# Patient Record
Sex: FEMALE | Race: WHITE | NOT HISPANIC OR LATINO | URBAN - METROPOLITAN AREA
[De-identification: names, ages, dates, MRNs, and addresses within clinical notes are randomized per-mention and may not be internally consistent; named-entity substitution may affect disease eponyms.]

---

## 2023-06-29 ENCOUNTER — OFFICE VISIT (OUTPATIENT)
Dept: PRIMARY CARE | Facility: CLINIC | Age: 78
End: 2023-06-29
Payer: MEDICARE

## 2023-06-29 VITALS — SYSTOLIC BLOOD PRESSURE: 131 MMHG | DIASTOLIC BLOOD PRESSURE: 74 MMHG

## 2023-06-29 DIAGNOSIS — R74.8 ELEVATED LIVER ENZYMES: ICD-10-CM

## 2023-06-29 DIAGNOSIS — K21.9 GASTROESOPHAGEAL REFLUX DISEASE WITHOUT ESOPHAGITIS: ICD-10-CM

## 2023-06-29 DIAGNOSIS — I10 PRIMARY HYPERTENSION: Primary | ICD-10-CM

## 2023-06-29 DIAGNOSIS — G40.309 NONINTRACTABLE GENERALIZED IDIOPATHIC EPILEPSY WITHOUT STATUS EPILEPTICUS (MULTI): ICD-10-CM

## 2023-06-29 DIAGNOSIS — M81.0 AGE RELATED OSTEOPOROSIS, UNSPECIFIED PATHOLOGICAL FRACTURE PRESENCE: ICD-10-CM

## 2023-06-29 PROCEDURE — 99204 OFFICE O/P NEW MOD 45 MIN: CPT | Performed by: INTERNAL MEDICINE

## 2023-06-29 PROCEDURE — 3078F DIAST BP <80 MM HG: CPT | Performed by: INTERNAL MEDICINE

## 2023-06-29 PROCEDURE — 3075F SYST BP GE 130 - 139MM HG: CPT | Performed by: INTERNAL MEDICINE

## 2023-06-29 NOTE — PROGRESS NOTES
Subjective   Patient ID: Consuelo Harrison is a 78 y.o. female who presents for No chief complaint on file..    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 more recently was diagnosed with seizures still has some word finding issues discussed with medication and neurology follow-up no chest pain no shortness of breath no side effect with medications otherwise    Past medical history seizure osteoporosis hyperlipidemia depression peptic ulcer disease    Medication Vimpat Fosamax clonazepam atorvastatin citalopram aspirin    Allergies no known drug allergies    Social history no tobacco no alcohol    Family history noted and unchanged no seizures  has 3 cancers    Prevention some exercise discussed with having had recent or scheduled bone density test some prior blood work reviewed lipids adequate blood count normal enzymes slightly elevated    Review of Systems    Objective   There were no vitals taken for this visit.    Physical Exam vital signs noted alert and oriented x3 NCAT PERRLA EOMI nares without discharge OP benign TM normal bilateral EAC clear bilateral no AC nodes no JVD no icterus no jaundice no pallor no thyromegaly chest clear to auscultation CV regular rate and rhythm S1-S2 abdomen soft nontender normal active bowel sounds extremities no clubbing cyanosis or edema normal distal pulses    Assessment/Plan impression seizures osteoporosis GERD elevated LFTs  hypertension  Plan not currently on medication for blood pressure after seizures return to normal or lower than expected and medication discontinued continue to monitor on diet and exercise Will return for physical examination and blood work because of her dyspepsia and similar symptoms in the epigastrium and lack of other known causes such as NSAIDs tobacco alcohol or food related issues but noted small elevation in liver enzymes will go off of the Fosamax x1 month May use Prilosec or Pepcid for  symptomatic release and consider abdominal imaging if no better good diet regular exercise increase water consumption follow-up with neurology regarding seizures and word finding recheck as above TT 50 cc 26

## 2023-09-29 ENCOUNTER — LAB (OUTPATIENT)
Dept: LAB | Facility: LAB | Age: 78
End: 2023-09-29
Payer: MEDICARE

## 2023-09-29 ENCOUNTER — OFFICE VISIT (OUTPATIENT)
Dept: PRIMARY CARE | Facility: CLINIC | Age: 78
End: 2023-09-29
Payer: MEDICARE

## 2023-09-29 VITALS — DIASTOLIC BLOOD PRESSURE: 68 MMHG | SYSTOLIC BLOOD PRESSURE: 118 MMHG

## 2023-09-29 DIAGNOSIS — R74.8 ELEVATED LIVER ENZYMES: ICD-10-CM

## 2023-09-29 DIAGNOSIS — I10 PRIMARY HYPERTENSION: ICD-10-CM

## 2023-09-29 DIAGNOSIS — K21.9 GASTROESOPHAGEAL REFLUX DISEASE WITHOUT ESOPHAGITIS: ICD-10-CM

## 2023-09-29 DIAGNOSIS — I10 PRIMARY HYPERTENSION: Primary | ICD-10-CM

## 2023-09-29 LAB
ALANINE AMINOTRANSFERASE (SGPT) (U/L) IN SER/PLAS: 22 U/L (ref 7–45)
ALBUMIN (G/DL) IN SER/PLAS: 4.7 G/DL (ref 3.4–5)
ALKALINE PHOSPHATASE (U/L) IN SER/PLAS: 71 U/L (ref 33–136)
ANION GAP IN SER/PLAS: 15 MMOL/L (ref 10–20)
ASPARTATE AMINOTRANSFERASE (SGOT) (U/L) IN SER/PLAS: 10 U/L (ref 9–39)
BILIRUBIN TOTAL (MG/DL) IN SER/PLAS: 0.4 MG/DL (ref 0–1.2)
CALCIUM (MG/DL) IN SER/PLAS: 10.2 MG/DL (ref 8.6–10.6)
CARBON DIOXIDE, TOTAL (MMOL/L) IN SER/PLAS: 28 MMOL/L (ref 21–32)
CHLORIDE (MMOL/L) IN SER/PLAS: 104 MMOL/L (ref 98–107)
CHOLESTEROL (MG/DL) IN SER/PLAS: 190 MG/DL (ref 0–199)
CHOLESTEROL IN HDL (MG/DL) IN SER/PLAS: 82.1 MG/DL
CHOLESTEROL/HDL RATIO: 2.3
CREATININE (MG/DL) IN SER/PLAS: 0.85 MG/DL (ref 0.5–1.05)
ERYTHROCYTE DISTRIBUTION WIDTH (RATIO) BY AUTOMATED COUNT: 12.6 % (ref 11.5–14.5)
ERYTHROCYTE MEAN CORPUSCULAR HEMOGLOBIN CONCENTRATION (G/DL) BY AUTOMATED: 32 G/DL (ref 32–36)
ERYTHROCYTE MEAN CORPUSCULAR VOLUME (FL) BY AUTOMATED COUNT: 94 FL (ref 80–100)
ERYTHROCYTES (10*6/UL) IN BLOOD BY AUTOMATED COUNT: 4.61 X10E12/L (ref 4–5.2)
GFR FEMALE: 71 ML/MIN/1.73M2
GLUCOSE (MG/DL) IN SER/PLAS: 79 MG/DL (ref 74–99)
HEMATOCRIT (%) IN BLOOD BY AUTOMATED COUNT: 43.5 % (ref 36–46)
HEMOGLOBIN (G/DL) IN BLOOD: 13.9 G/DL (ref 12–16)
LDL: 89 MG/DL (ref 0–99)
LEUKOCYTES (10*3/UL) IN BLOOD BY AUTOMATED COUNT: 5.7 X10E9/L (ref 4.4–11.3)
NRBC (PER 100 WBCS) BY AUTOMATED COUNT: 0 /100 WBC (ref 0–0)
PLATELETS (10*3/UL) IN BLOOD AUTOMATED COUNT: 240 X10E9/L (ref 150–450)
POTASSIUM (MMOL/L) IN SER/PLAS: 4.7 MMOL/L (ref 3.5–5.3)
PROTEIN TOTAL: 6.6 G/DL (ref 6.4–8.2)
SODIUM (MMOL/L) IN SER/PLAS: 142 MMOL/L (ref 136–145)
TRIGLYCERIDE (MG/DL) IN SER/PLAS: 93 MG/DL (ref 0–149)
UREA NITROGEN (MG/DL) IN SER/PLAS: 21 MG/DL (ref 6–23)
VLDL: 19 MG/DL (ref 0–40)

## 2023-09-29 PROCEDURE — 36415 COLL VENOUS BLD VENIPUNCTURE: CPT

## 2023-09-29 PROCEDURE — 80053 COMPREHEN METABOLIC PANEL: CPT

## 2023-09-29 PROCEDURE — 80061 LIPID PANEL: CPT

## 2023-09-29 PROCEDURE — 3078F DIAST BP <80 MM HG: CPT | Performed by: INTERNAL MEDICINE

## 2023-09-29 PROCEDURE — 99213 OFFICE O/P EST LOW 20 MIN: CPT | Performed by: INTERNAL MEDICINE

## 2023-09-29 PROCEDURE — 3074F SYST BP LT 130 MM HG: CPT | Performed by: INTERNAL MEDICINE

## 2023-09-29 PROCEDURE — 85027 COMPLETE CBC AUTOMATED: CPT

## 2023-09-29 NOTE — PROGRESS NOTES
Subjective   Patient ID: Consuelo Harrison is a 78 y.o. female who presents for No chief complaint on file..    HPI follow-up visit has had no seizures no falls blood pressures have been good felt generally well occasionally will have some reflux type symptoms had some swelling in her right lower leg that was minor holding her legs up helped prevent it from reoccurring bowels normal no dysuria    Review of Systems    Objective   There were no vitals taken for this visit.    Physical Exam vital signs noted alert and oriented x3 NCAT color is good no JVD chest clear to auscultation CV regular rate and rhythm S1 is 2 abdomen soft nontender normal active bowel sounds extremities no clubbing cyanosis or edema normal distal pulses gait slightly unsteady no tremor    Assessment/Plan    impression hypertension?  Seizure?  Elevated liver enzymes?  GERD other diagnoses  Plan check Chem-7 advised on glucose potassium and kidney function has been on less blood pressure medication blood pressure adequate today check CBC advised on blood count avoid NSAIDs okay for Pepcid as she is try not eating late at night check hepatic panel advised on liver enzymes see prior check lipid panel advised on cholesterol profile good diet regular exercise increase water consumption care and safety in the home follow-up with neurology continue with seizure medication and recheck based on above

## 2023-12-05 DIAGNOSIS — Z00.00 HEALTH CARE MAINTENANCE: Primary | ICD-10-CM

## 2023-12-05 RX ORDER — ALENDRONATE SODIUM 70 MG/1
70 TABLET ORAL
Qty: 90 TABLET | Refills: 0 | Status: SHIPPED | OUTPATIENT
Start: 2023-12-05

## 2023-12-05 RX ORDER — ALENDRONATE SODIUM 70 MG/1
70 TABLET ORAL WEEKLY
COMMUNITY
Start: 2016-11-02 | End: 2023-12-05 | Stop reason: SDUPTHER

## 2024-07-26 ENCOUNTER — TELEMEDICINE (OUTPATIENT)
Dept: PRIMARY CARE | Facility: CLINIC | Age: 79
End: 2024-07-26
Payer: MEDICARE

## 2024-07-26 DIAGNOSIS — U07.1 COVID-19 VIRUS INFECTION: Primary | ICD-10-CM

## 2024-07-26 DIAGNOSIS — J01.90 ACUTE SINUSITIS, RECURRENCE NOT SPECIFIED, UNSPECIFIED LOCATION: ICD-10-CM

## 2024-07-26 DIAGNOSIS — R53.83 OTHER FATIGUE: ICD-10-CM

## 2024-07-26 PROCEDURE — 99442 PR PHYS/QHP TELEPHONE EVALUATION 11-20 MIN: CPT | Performed by: INTERNAL MEDICINE

## 2024-07-26 NOTE — PROGRESS NOTES
Subjective   Patient ID: Consuelo Harrison is a 79 y.o. female who presents for No chief complaint on file..    HPI patient initiated telehealth visit this visit was completed via telephone secondary to the restrictions of the COVID-19 pandemic as it was actually about COVID patient was at home physician was in the office all medical issues were addressed and considered with patient patient was not otherwise examined if it was felt that the patient needed to be seen in the office they would have been referred to do so patient verbally consented to visit  Sick visit had COVID-positive test yesterday had symptoms for 2 days uncertain where she been in contact with that but has overwhelming fatigue low-temperature in the high 90s and some sinus no chest pain no shortness of breath    Review of Systems    Objective   There were no vitals taken for this visit.    Physical Exam alert and oriented x 3 breathing unlabored not otherwise examined    Assessment/Plan impression COVID-positive sinus fatigue  Plan okay for Paxlovid therapy 3 tablets in the morning 3 tablets in the evening x 5 days see EMR wrist benefits side effects reviewed with her and wishes to proceed 5 days of masking after isolation and betterment of symptoms good water consumption good nutrition proper rest Tylenol as needed Zyrtec 10 mg as needed per day for the sinus recheck if no better and for regular visit

## 2024-07-26 NOTE — PROGRESS NOTES
Pt was informed of MD note below VIA my chart. Rx sent to pt's pharmacy in [place] as requested.    Subjective   Patient ID: Consuelo Harrison is a 79 y.o. female who presents for No chief complaint on file..    HPI     Review of Systems    Objective   There were no vitals taken for this visit.    Physical Exam    Assessment/Plan       Alexy Garcia MD  Physician  Primary Care     Progress Notes      Sign when Signing Visit     Creation Time: 7/26/2024  1:03 PM     Sign when Signing Visit       Expand All Collapse All       Subjective  Patient ID: Consuelo Harrison is a 79 y.o. female who presents for No chief complaint on file..     HPI patient initiated telehealth visit this visit was completed via telephone secondary to the restrictions of the COVID-19 pandemic as it was actually about COVID patient was at home physician was in the office all medical issues were addressed and considered with patient patient was not otherwise examined if it was felt that the patient needed to be seen in the office they would have been referred to do so patient verbally consented to visit  Sick visit had COVID-positive test yesterday had symptoms for 2 days uncertain where she been in contact with that but has overwhelming fatigue low-temperature in the high 90s and some sinus no chest pain no shortness of breath     Review of Systems           Objective  There were no vitals taken for this visit.     Physical Exam alert and oriented x 3 breathing unlabored not otherwise examined           Assessment/Plan  impression COVID-positive sinus fatigue  Plan okay for Paxlovid therapy 3 tablets in the morning 3 tablets in the evening x 5 days see EMR wrist benefits side effects reviewed with her and wishes to proceed 5 days of masking after isolation and betterment of symptoms good water consumption good nutrition proper rest Tylenol as needed Zyrtec 10 mg as needed per day for the sinus recheck if no better and for regular visit     tt15''                         Orders Only on 7/26/2024       Additional Documentation    Encounter Info:  Billing Info,     History,     Allergies     Orders Placed    None  Medication Changes      nirmatrelvir/ritonavir 300 mg (150 mg x 2)-100 mg 3 tablets oral 2 times daily, Follow the instructions on the package  Medication List  Visit Diagnoses      COVID-19 virus infection  Problem List

## 2024-09-09 ENCOUNTER — LAB (OUTPATIENT)
Dept: LAB | Facility: LAB | Age: 79
End: 2024-09-09
Payer: MEDICARE

## 2024-09-09 ENCOUNTER — APPOINTMENT (OUTPATIENT)
Dept: PRIMARY CARE | Facility: CLINIC | Age: 79
End: 2024-09-09
Payer: MEDICARE

## 2024-09-09 VITALS — DIASTOLIC BLOOD PRESSURE: 69 MMHG | WEIGHT: 152 LBS | SYSTOLIC BLOOD PRESSURE: 124 MMHG

## 2024-09-09 DIAGNOSIS — R55 VASOVAGAL SYNCOPE: ICD-10-CM

## 2024-09-09 DIAGNOSIS — Z00.00 HEALTH CARE MAINTENANCE: Primary | ICD-10-CM

## 2024-09-09 DIAGNOSIS — E78.2 MIXED HYPERLIPIDEMIA: ICD-10-CM

## 2024-09-09 DIAGNOSIS — K21.9 GASTROESOPHAGEAL REFLUX DISEASE WITHOUT ESOPHAGITIS: ICD-10-CM

## 2024-09-09 DIAGNOSIS — I10 PRIMARY HYPERTENSION: ICD-10-CM

## 2024-09-09 DIAGNOSIS — N02.9 IDIOPATHIC HEMATURIA, UNSPECIFIED WHETHER GLOMERULAR MORPHOLOGIC CHANGES PRESENT: ICD-10-CM

## 2024-09-09 DIAGNOSIS — R56.9 SEIZURE (MULTI): ICD-10-CM

## 2024-09-09 LAB
ALBUMIN SERPL BCP-MCNC: 4.7 G/DL (ref 3.4–5)
ALP SERPL-CCNC: 77 U/L (ref 33–136)
ALT SERPL W P-5'-P-CCNC: 24 U/L (ref 7–45)
ANION GAP SERPL CALC-SCNC: 10 MMOL/L (ref 10–20)
AST SERPL W P-5'-P-CCNC: 10 U/L (ref 9–39)
BILIRUB SERPL-MCNC: 0.5 MG/DL (ref 0–1.2)
BUN SERPL-MCNC: 20 MG/DL (ref 6–23)
CALCIUM SERPL-MCNC: 10.6 MG/DL (ref 8.6–10.6)
CHLORIDE SERPL-SCNC: 106 MMOL/L (ref 98–107)
CHOLEST SERPL-MCNC: 204 MG/DL (ref 0–199)
CHOLESTEROL/HDL RATIO: 2.4
CO2 SERPL-SCNC: 30 MMOL/L (ref 21–32)
CREAT SERPL-MCNC: 0.96 MG/DL (ref 0.5–1.05)
EGFRCR SERPLBLD CKD-EPI 2021: 61 ML/MIN/1.73M*2
ERYTHROCYTE [DISTWIDTH] IN BLOOD BY AUTOMATED COUNT: 12.6 % (ref 11.5–14.5)
GLUCOSE SERPL-MCNC: 94 MG/DL (ref 74–99)
HCT VFR BLD AUTO: 42.4 % (ref 36–46)
HDLC SERPL-MCNC: 86 MG/DL
HGB BLD-MCNC: 13.9 G/DL (ref 12–16)
LDLC SERPL CALC-MCNC: 90 MG/DL
MCH RBC QN AUTO: 30.4 PG (ref 26–34)
MCHC RBC AUTO-ENTMCNC: 32.8 G/DL (ref 32–36)
MCV RBC AUTO: 93 FL (ref 80–100)
NON HDL CHOLESTEROL: 118 MG/DL (ref 0–149)
NRBC BLD-RTO: 0 /100 WBCS (ref 0–0)
PLATELET # BLD AUTO: 255 X10*3/UL (ref 150–450)
POTASSIUM SERPL-SCNC: 4.3 MMOL/L (ref 3.5–5.3)
PROT SERPL-MCNC: 6.8 G/DL (ref 6.4–8.2)
RBC # BLD AUTO: 4.57 X10*6/UL (ref 4–5.2)
SODIUM SERPL-SCNC: 142 MMOL/L (ref 136–145)
TRIGL SERPL-MCNC: 139 MG/DL (ref 0–149)
TSH SERPL-ACNC: 1.61 MIU/L (ref 0.44–3.98)
VLDL: 28 MG/DL (ref 0–40)
WBC # BLD AUTO: 8.1 X10*3/UL (ref 4.4–11.3)

## 2024-09-09 PROCEDURE — 85027 COMPLETE CBC AUTOMATED: CPT

## 2024-09-09 PROCEDURE — 1170F FXNL STATUS ASSESSED: CPT | Performed by: INTERNAL MEDICINE

## 2024-09-09 PROCEDURE — 99214 OFFICE O/P EST MOD 30 MIN: CPT | Performed by: INTERNAL MEDICINE

## 2024-09-09 PROCEDURE — 3078F DIAST BP <80 MM HG: CPT | Performed by: INTERNAL MEDICINE

## 2024-09-09 PROCEDURE — G0439 PPPS, SUBSEQ VISIT: HCPCS | Performed by: INTERNAL MEDICINE

## 2024-09-09 PROCEDURE — 81003 URINALYSIS AUTO W/O SCOPE: CPT

## 2024-09-09 PROCEDURE — 84443 ASSAY THYROID STIM HORMONE: CPT

## 2024-09-09 PROCEDURE — 1036F TOBACCO NON-USER: CPT | Performed by: INTERNAL MEDICINE

## 2024-09-09 PROCEDURE — 80053 COMPREHEN METABOLIC PANEL: CPT

## 2024-09-09 PROCEDURE — 1159F MED LIST DOCD IN RCRD: CPT | Performed by: INTERNAL MEDICINE

## 2024-09-09 PROCEDURE — 93000 ELECTROCARDIOGRAM COMPLETE: CPT | Performed by: INTERNAL MEDICINE

## 2024-09-09 PROCEDURE — 80061 LIPID PANEL: CPT

## 2024-09-09 PROCEDURE — 1160F RVW MEDS BY RX/DR IN RCRD: CPT | Performed by: INTERNAL MEDICINE

## 2024-09-09 PROCEDURE — 36415 COLL VENOUS BLD VENIPUNCTURE: CPT

## 2024-09-09 PROCEDURE — 3074F SYST BP LT 130 MM HG: CPT | Performed by: INTERNAL MEDICINE

## 2024-09-09 PROCEDURE — 99397 PER PM REEVAL EST PAT 65+ YR: CPT | Performed by: INTERNAL MEDICINE

## 2024-09-09 NOTE — PROGRESS NOTES
Date 8 Subjective   Patient ID: Consuelo Harrison is a 79 y.o. female who presents for No chief complaint on file..    HPI CPE see updated front sheet no chest pain no shortness of breath had 2 episodes of syncope over the summer 1 at a wedding went well at her pool at home no particular instigators was not like her typical seizures there was no aura she awoke afterwards and had fleeting minimal short-term memory loss about the events themselves  No chest pain no shortness of breath no side effect with medication had been in touch with her neurologist bowels normal no dysuria    Past medical history seizure osteoporosis hyperlipidemia depression peptic ulcer disease    Medication Vimpat Fosamax clonazepam atorvastatin citalopram aspirin    Allergies no known drug allergies    Social history no tobacco no alcohol    Family history noted and unchanged no seizures  has 3 cancers noted and unchanged    Prevention some exercise  discussed with bone density test discussed with CT scan    Depression screen not depressed prior blood work reviewed lipids adequate blood count normal enzymes slightly elevated    Review of Systems    Objective   There were no vitals taken for this visit.    Physical Exam vital signs noted alert and oriented x3 NCAT PERRLA EOMI nares without discharge OP benign TM normal bilateral EAC clear bilateral no AC nodes no JVD no icterus no jaundice no pallor no thyromegaly chest clear to auscultation CV regular rate and rhythm S1-S2 without murmur gallop or rub LS spine normal curvature negative straight leg raise abdomen soft nontender normal active bowel sounds extremities no clubbing cyanosis or edema normal distal pulses DTR 2+ General Medical examination    Assessment/Plan impression renal cyst seizures osteoporosis GERD elevated LFTs  hypertension/hematuria blood pressure has been in good or better standing check EKG advised on heart check comprehensive panel advised on glucose potassium and  kidney function as well as liver function check CBC advised on blood count follow-up for next colonoscopy check TSH advised on thyroid check lipid panel advised on cholesterol profile good regular exercise good water consumption follow-up with urology  Plan avoidance of NSAIDs okay for PPI or H2 blocker check urinalysis she had a single episode of some brown-colored urine painless at the time known kidney cyst was on her CT scan CT scan of her head was also reviewed calcium and vitamin D is on mentation check her next bone density and recheck 1 to 3 months based on above if there are any other syncopal episodes go to emergency room TT 60 cc 31

## 2024-09-10 LAB
APPEARANCE UR: CLEAR
BILIRUB UR STRIP.AUTO-MCNC: NEGATIVE MG/DL
COLOR UR: NORMAL
GLUCOSE UR STRIP.AUTO-MCNC: NORMAL MG/DL
KETONES UR STRIP.AUTO-MCNC: NEGATIVE MG/DL
LEUKOCYTE ESTERASE UR QL STRIP.AUTO: NEGATIVE
NITRITE UR QL STRIP.AUTO: NEGATIVE
PH UR STRIP.AUTO: 6 [PH]
PROT UR STRIP.AUTO-MCNC: NEGATIVE MG/DL
RBC # UR STRIP.AUTO: NEGATIVE /UL
SP GR UR STRIP.AUTO: 1.01
UROBILINOGEN UR STRIP.AUTO-MCNC: NORMAL MG/DL

## 2024-09-16 ASSESSMENT — PATIENT HEALTH QUESTIONNAIRE - PHQ9
2. FEELING DOWN, DEPRESSED OR HOPELESS: NOT AT ALL
1. LITTLE INTEREST OR PLEASURE IN DOING THINGS: NOT AT ALL
SUM OF ALL RESPONSES TO PHQ9 QUESTIONS 1 AND 2: 0

## 2024-09-16 ASSESSMENT — ACTIVITIES OF DAILY LIVING (ADL)
TAKING_MEDICATION: INDEPENDENT
DOING_HOUSEWORK: INDEPENDENT
DRESSING: INDEPENDENT
GROCERY_SHOPPING: INDEPENDENT
BATHING: INDEPENDENT
MANAGING_FINANCES: INDEPENDENT

## 2024-09-16 ASSESSMENT — ENCOUNTER SYMPTOMS
LOSS OF SENSATION IN FEET: 0
DEPRESSION: 0
OCCASIONAL FEELINGS OF UNSTEADINESS: 0

## 2024-11-26 ENCOUNTER — APPOINTMENT (OUTPATIENT)
Dept: RADIOLOGY | Facility: HOSPITAL | Age: 79
End: 2024-11-26
Payer: MEDICARE

## 2024-11-26 ENCOUNTER — HOSPITAL ENCOUNTER (EMERGENCY)
Facility: HOSPITAL | Age: 79
Discharge: HOME | End: 2024-11-26
Payer: MEDICARE

## 2024-11-26 VITALS
HEIGHT: 62 IN | WEIGHT: 150 LBS | BODY MASS INDEX: 27.6 KG/M2 | HEART RATE: 64 BPM | OXYGEN SATURATION: 100 % | RESPIRATION RATE: 18 BRPM | TEMPERATURE: 97.3 F | DIASTOLIC BLOOD PRESSURE: 77 MMHG | SYSTOLIC BLOOD PRESSURE: 171 MMHG

## 2024-11-26 DIAGNOSIS — S40.021A ARM CONTUSION, RIGHT, INITIAL ENCOUNTER: Primary | ICD-10-CM

## 2024-11-26 PROCEDURE — 2500000001 HC RX 250 WO HCPCS SELF ADMINISTERED DRUGS (ALT 637 FOR MEDICARE OP): Performed by: NURSE PRACTITIONER

## 2024-11-26 PROCEDURE — 73090 X-RAY EXAM OF FOREARM: CPT | Mod: RIGHT SIDE | Performed by: RADIOLOGY

## 2024-11-26 PROCEDURE — 73090 X-RAY EXAM OF FOREARM: CPT | Mod: RT

## 2024-11-26 PROCEDURE — 99283 EMERGENCY DEPT VISIT LOW MDM: CPT

## 2024-11-26 RX ORDER — HYDROCODONE BITARTRATE AND ACETAMINOPHEN 5; 325 MG/1; MG/1
1 TABLET ORAL ONCE
Status: COMPLETED | OUTPATIENT
Start: 2024-11-26 | End: 2024-11-26

## 2024-11-26 ASSESSMENT — COLUMBIA-SUICIDE SEVERITY RATING SCALE - C-SSRS
1. IN THE PAST MONTH, HAVE YOU WISHED YOU WERE DEAD OR WISHED YOU COULD GO TO SLEEP AND NOT WAKE UP?: NO
6. HAVE YOU EVER DONE ANYTHING, STARTED TO DO ANYTHING, OR PREPARED TO DO ANYTHING TO END YOUR LIFE?: NO
2. HAVE YOU ACTUALLY HAD ANY THOUGHTS OF KILLING YOURSELF?: NO

## 2024-11-26 ASSESSMENT — PAIN DESCRIPTION - ORIENTATION
ORIENTATION: RIGHT
ORIENTATION: RIGHT;DISTAL

## 2024-11-26 ASSESSMENT — LIFESTYLE VARIABLES
TOTAL SCORE: 0
EVER FELT BAD OR GUILTY ABOUT YOUR DRINKING: NO
EVER HAD A DRINK FIRST THING IN THE MORNING TO STEADY YOUR NERVES TO GET RID OF A HANGOVER: NO
HAVE YOU EVER FELT YOU SHOULD CUT DOWN ON YOUR DRINKING: NO
HAVE PEOPLE ANNOYED YOU BY CRITICIZING YOUR DRINKING: NO

## 2024-11-26 ASSESSMENT — PAIN - FUNCTIONAL ASSESSMENT: PAIN_FUNCTIONAL_ASSESSMENT: 0-10

## 2024-11-26 ASSESSMENT — PAIN SCALES - GENERAL
PAINLEVEL_OUTOF10: 10 - WORST POSSIBLE PAIN
PAINLEVEL_OUTOF10: 10 - WORST POSSIBLE PAIN

## 2024-11-26 ASSESSMENT — PAIN DESCRIPTION - LOCATION
LOCATION: ARM
LOCATION: ARM

## 2024-11-26 ASSESSMENT — PAIN DESCRIPTION - PAIN TYPE: TYPE: ACUTE PAIN

## 2024-11-27 NOTE — ED PROVIDER NOTES
HPI   Chief Complaint   Patient presents with    Arm Injury     Pt comes in after falling in the shower and injuring her right arm. Pt has a lump on her upper right forearm and states the pain has gradually been increasing, worst at wrist.       HPI  See my MDM      Patient History   Past Medical History:   Diagnosis Date    Acute gastric ulcer without hemorrhage or perforation 06/15/2017    Acute gastric ulcer without hemorrhage or perforation    Age-related osteoporosis without current pathological fracture 09/28/2017    Osteoporosis, postmenopausal    Benign paroxysmal vertigo, unspecified ear 08/08/2016    Benign paroxysmal positional vertigo, unspecified laterality    Diverticulitis of intestine, part unspecified, without perforation or abscess without bleeding 11/03/2015    Acute diverticulitis    Hyperlipidemia, unspecified 09/28/2017    Hyperlipidemia    Low back pain, unspecified 08/21/2015    Right-sided low back pain without sciatica    Other conditions influencing health status 11/03/2015    Chest pain, unspecified chest pain type    Personal history of other endocrine, nutritional and metabolic disease 07/26/2017    History of hypokalemia    Personal history of other mental and behavioral disorders 09/28/2017    History of depression    Venous insufficiency (chronic) (peripheral) 04/08/2015    Venous stasis dermatitis of right lower extremity     Past Surgical History:   Procedure Laterality Date    DILATION AND CURETTAGE OF UTERUS  06/02/2016    Dilation And Curettage     No family history on file.  Social History     Tobacco Use    Smoking status: Never    Smokeless tobacco: Never   Substance Use Topics    Alcohol use: Not on file    Drug use: Not on file       Physical Exam   ED Triage Vitals [11/26/24 1849]   Temperature Heart Rate Respirations BP   36.3 °C (97.3 °F) 64 18 171/77      Pulse Ox Temp src Heart Rate Source Patient Position   100 % -- -- --      BP Location FiO2 (%)     -- --        Physical Exam  CONSTITUTIONAL: Vital signs reviewed as charted, well-developed and in no distress  Eyes: Extraocular muscles are intact. Pupils equal round and reactive to light. Conjunctiva are pink.    ENT: Mucous membranes are moist. Tongue in the midline. Pharynx was without erythema or exudates, uvula midline  LUNGS: Breath sounds equal and clear to auscultation. Good air exchange, no wheezes rales or retractions, pulse oximetry is charted.  HEART: Regular rate and rhythm without murmur thrill or rub, strong tones, auscultation is normal.  ABDOMEN: Soft and nontender without guarding rebound rigidity or mass. Bowel sounds are present and normal in all quadrants. There is no palpable masses or aneurysms identified. No hepatosplenomegaly, normal abdominal exam.  Neuro: The patient is awake, alert and oriented ×3. Moving all 4 extremities and answering questions appropriately.   MUSCULOSKELETAL: Exam of the right forearm shows ecchymosis and edema to the lateral proximal portion.  There is no obvious deformity.  Motor sensation pulses are intact distally.  Cap refill less than 2 seconds.  PSYCH: Awake alert oriented, normal mood and affect.  Skin:  Dry, normal color, warm to the touch, no rash present.        ED Course & MDM   Diagnoses as of 11/26/24 1920   Arm contusion, right, initial encounter                 No data recorded     [place] Coma Scale Score: 15 (11/26/24 1916 : Jada Sharif RN)                           Medical Decision Making  History obtained from: patient    Vital signs, nursing notes, current medications, past medical history, Surgical history, allergies, social history, family History were reviewed.         HPI:  Patient 79-year-old female present emergency room today after slipping and falling in the bathtub this morning.  She has a large swelling on her proximal lateral forearm.  Denies any her head.  Denies take anticoagulants.  Nontoxic and well-appearing      10 point ROS was  reviewed and negative except Noted above in HPI.  DDX: as listed above          MDM Summary/considerations:  Labs Reviewed - No data to display  XR forearm right 2 views   Final Result        Soft tissue swelling over the dorsum of the right proximal forearm.        No evidence of fracture or underlying osseous abnormality.        Signed by: Rolando Cunha 11/26/2024 7:13 PM   Dictation workstation:   [id number]        Medications   HYDROcodone-acetaminophen (Norco) 5-325 mg per tablet 1 tablet (1 tablet oral Given 11/26/24 1915)     New Prescriptions    No medications on file     I estimate there is LOW risk for COMPARTMENT SYNDROME, DEEP VENOUS THROMBOSIS, SEPTIC ARTHRITIS, TENDON OR NEUROVASCULAR INJURY, thus I consider the discharge disposition reasonable. We have discussed the diagnosis and risks, and we agree with discharging home to follow-up with their primary doctor or the referral orthopedist. We also discussed returning to the Emergency Department immediately if new or worsening symptoms occur. We have discussed the symptoms which aremost concerning (e.g., changing or worsening pain, numbness, weakness) that necessitates immediate return.    X-ray shows no acute bony abnormality.  Patient instructed rest ice compression elevation, anti-inflammatories.  Will follow with her PCP for reevaluation was discharged home stable condition.    All of the patient's questions were answered to the best of my ability.  Patient states understanding that they have been screened for an emergency today and we have not found any etiology of symptoms that requires emergent treatment or admission to the hospital at this point. They understand that they have not had definitive care day and require follow-up for treatment of their condition. They also state understanding that they may have an emergent condition that may potentially have not of detected at this visit and they must return to the emergency department if they  develop any worsening of symptoms or new complaints.      I have evaluated this patient, my supervising physician was available for consultation.              Critical Care: Not warranted at this time        This chart was completed using voice recognition transcription software. Please excuse any errors of transcription including grammatical, punctuation, syntax and spelling errors.  Please contact me with any questions regarding this chart.    Procedure  Procedures     OSBALDO Mackenzie-CNP  11/26/24 1929

## 2024-12-03 ENCOUNTER — TELEPHONE (OUTPATIENT)
Dept: PRIMARY CARE | Facility: CLINIC | Age: 79
End: 2024-12-03

## 2024-12-12 ENCOUNTER — TELEPHONE (OUTPATIENT)
Dept: PRIMARY CARE | Facility: CLINIC | Age: 79
End: 2024-12-12

## 2024-12-12 DIAGNOSIS — E78.2 MIXED HYPERLIPIDEMIA: Primary | ICD-10-CM

## 2024-12-12 RX ORDER — ATORVASTATIN CALCIUM 40 MG/1
40 TABLET, FILM COATED ORAL
COMMUNITY
Start: 2024-02-19 | End: 2024-12-12 | Stop reason: SDUPTHER

## 2024-12-14 RX ORDER — ATORVASTATIN CALCIUM 40 MG/1
40 TABLET, FILM COATED ORAL
Qty: 90 TABLET | Refills: 0 | Status: SHIPPED | OUTPATIENT
Start: 2024-12-14

## 2025-09-09 ENCOUNTER — APPOINTMENT (OUTPATIENT)
Dept: PRIMARY CARE | Facility: CLINIC | Age: 80
End: 2025-09-09
Payer: MEDICARE